# Patient Record
Sex: FEMALE | Race: WHITE | NOT HISPANIC OR LATINO | Employment: FULL TIME | URBAN - METROPOLITAN AREA
[De-identification: names, ages, dates, MRNs, and addresses within clinical notes are randomized per-mention and may not be internally consistent; named-entity substitution may affect disease eponyms.]

---

## 2021-09-28 ENCOUNTER — TRANSCRIBE ORDERS (OUTPATIENT)
Dept: PHYSICAL THERAPY | Facility: CLINIC | Age: 36
End: 2021-09-28

## 2021-09-28 DIAGNOSIS — M54.2 PAIN, NECK: Primary | ICD-10-CM

## 2021-09-28 DIAGNOSIS — M54.9 UPPER BACK PAIN: ICD-10-CM

## 2021-09-30 ENCOUNTER — TREATMENT (OUTPATIENT)
Dept: PHYSICAL THERAPY | Facility: CLINIC | Age: 36
End: 2021-09-30

## 2021-09-30 DIAGNOSIS — M54.2 PAIN, NECK: Primary | ICD-10-CM

## 2021-09-30 PROCEDURE — 97161 PT EVAL LOW COMPLEX 20 MIN: CPT | Performed by: PHYSICAL THERAPIST

## 2021-09-30 PROCEDURE — 97530 THERAPEUTIC ACTIVITIES: CPT | Performed by: PHYSICAL THERAPIST
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
## 2021-10-04 ENCOUNTER — TELEPHONE (OUTPATIENT)
Dept: PHYSICAL THERAPY | Facility: CLINIC | Age: 36
End: 2021-10-04

## 2021-10-06 ENCOUNTER — TREATMENT (OUTPATIENT)
Dept: PHYSICAL THERAPY | Facility: CLINIC | Age: 36
End: 2021-10-06

## 2021-10-06 DIAGNOSIS — M54.2 PAIN, NECK: Primary | ICD-10-CM

## 2021-10-06 PROCEDURE — 97110 THERAPEUTIC EXERCISES: CPT | Performed by: PHYSICAL THERAPIST

## 2021-10-06 PROCEDURE — 97530 THERAPEUTIC ACTIVITIES: CPT | Performed by: PHYSICAL THERAPIST

## 2021-10-06 PROCEDURE — 97140 MANUAL THERAPY 1/> REGIONS: CPT | Performed by: PHYSICAL THERAPIST

## 2021-10-06 NOTE — PROGRESS NOTES
Physical Therapy Daily Progress Note        Patient: Desi Jean   : 1985  Diagnosis/ICD-10 Code:  Pain, neck [M54.2]  Referring practitioner: Wilver Palomino MD  Date of Initial Visit: Type: THERAPY  Noted: 2021  Today's Date: 10/6/2021  Patient seen for 2 sessions           Patient reports: Patient reports less frequent muscle pain in her neck but her headache frequency has increased (every couple hours, lasting 45 min to 1 hour).       Objective          Active Range of Motion   Cervical/Thoracic Spine   Cervical    Left rotation: 65 degrees   Right rotation: 60 degrees       See Exercise, Manual, and Modality Logs for complete treatment.       Assessment/Plan    Improved cervical AROM noted compared to eval. Cues for form decreased neck pain during supine DNF, as patient was lifting her head at home. Added UBE and band shoulder ext for tissue perfusion and scapular retraining. Overall patient's irritability is improving.          Timed:  Manual Therapy:    10     mins  91251;  Therapeutic Exercise:    15     mins  78369;     Neuromuscular Jessica:   20    mins  06874;    Therapeutic Activity:     0     mins  21999;     Gait Trainin     mins  92948;     Ultrasound:     0     mins  88326;    Electrical Stimulation:    0     mins  83472 ( );    Untimed:  Electrical Stimulation:    0     mins  94190 ( );  Mechanical Traction:    0     mins  30349;     Timed Treatment:   0   mins   Total Treatment:     0   mins    Randy Mckinney PT  Physical Therapist

## 2021-10-11 ENCOUNTER — TREATMENT (OUTPATIENT)
Dept: PHYSICAL THERAPY | Facility: CLINIC | Age: 36
End: 2021-10-11

## 2021-10-11 DIAGNOSIS — M54.2 PAIN, NECK: Primary | ICD-10-CM

## 2021-10-11 PROCEDURE — 97112 NEUROMUSCULAR REEDUCATION: CPT | Performed by: PHYSICAL THERAPIST

## 2021-10-11 PROCEDURE — 97140 MANUAL THERAPY 1/> REGIONS: CPT | Performed by: PHYSICAL THERAPIST

## 2021-10-11 NOTE — PROGRESS NOTES
Physical Therapy Daily Progress Note        Patient: Desi Jean   : 1985  Diagnosis/ICD-10 Code:  Pain, neck [M54.2]  Referring practitioner: Wilver Palomino MD  Date of Initial Visit: Type: THERAPY  Noted: 2021  Today's Date: 10/11/2021  Patient seen for 3 sessions           Patient reports: increased headache from long walk with her baby to PT. Tried to ice her neck, but started feeling pain after a few minutes and stopped.       Objective   See Exercise, Manual, and Modality Logs for complete treatment.       Assessment/Plan    With treatment shortened due to patient tardiness, today's focus was on reinforcing HEP, educating about cryotherapy at home, and attempting manual interventions. Held band exercises due to poor stability, likely due to patient still recovering from .          Timed:  Manual Therapy:    15     mins  64364;  Therapeutic Exercise:    0     mins  22852;     Neuromuscular Jessica:   15    mins  13340;    Therapeutic Activity:     0     mins  96927;     Gait Trainin     mins  68061;     Ultrasound:     0     mins  28379;    Electrical Stimulation:    0     mins  87674 ( );    Untimed:  Electrical Stimulation:    0     mins  30466 ( );  Mechanical Traction:    0     mins  51419;     Timed Treatment:   30   mins   Total Treatment:     30   mins    Randy Mckinney PT  Physical Therapist

## 2021-10-12 ENCOUNTER — TELEPHONE (OUTPATIENT)
Dept: PHYSICAL THERAPY | Facility: CLINIC | Age: 36
End: 2021-10-12

## 2021-10-21 ENCOUNTER — TREATMENT (OUTPATIENT)
Dept: PHYSICAL THERAPY | Facility: CLINIC | Age: 36
End: 2021-10-21

## 2021-10-21 DIAGNOSIS — M54.2 PAIN, NECK: Primary | ICD-10-CM

## 2021-10-21 PROCEDURE — 97110 THERAPEUTIC EXERCISES: CPT | Performed by: PHYSICAL THERAPIST

## 2021-10-21 PROCEDURE — 97530 THERAPEUTIC ACTIVITIES: CPT | Performed by: PHYSICAL THERAPIST

## 2021-10-21 PROCEDURE — 97140 MANUAL THERAPY 1/> REGIONS: CPT | Performed by: PHYSICAL THERAPIST

## 2021-10-21 NOTE — PROGRESS NOTES
Physical Therapy Daily Progress Note        Patient: Desi Jean   : 1985  Diagnosis/ICD-10 Code:  Pain, neck [M54.2]  Referring practitioner: Wilver Palomino MD  Date of Initial Visit: Type: THERAPY  Noted: 2021  Today's Date: 10/21/2021  Patient seen for 4 sessions           Patient reports: her headaches and neck have improved since last visit, but her L neck and low back have remained painful. Slouching relieves low back pain.      Objective          Palpation     Additional Palpation Details  Joint mobility: hypomobile and painful bilateral OA and AA (all referred into headache), normal mid-cervical mobility, and hypomobile lower cervical and upper thoracic segments      See Exercise, Manual, and Modality Logs for complete treatment.       Assessment/Plan  Able to assess patient in prone for first time. Hypomobility noted in thoracic, lower and upper cervical spine, with upper cervical spine reproducing headache. Added HEP for AA mobility. Progressed DNF to include lift with fair tolerance. C/O low back pain reduced with posterior pelvic tilt, but no rectus exercises performed due to <6 weeks s/p .            Timed:  Manual Therapy:    15     mins  28489;  Therapeutic Exercise:    23     mins  10148;     Neuromuscular Jessica:   0    mins  49365;    Therapeutic Activity:     15     mins  96161;     Gait Trainin     mins  12184;     Ultrasound:     0     mins  95326;    Electrical Stimulation:    0     mins  38367 ( );    Untimed:  Electrical Stimulation:    0     mins  62927 ( );  Mechanical Traction:    0     mins  82365;     Timed Treatment:   53   mins   Total Treatment:     53   mins    Randy Mckinney PT  Physical Therapist

## 2021-10-26 ENCOUNTER — TREATMENT (OUTPATIENT)
Dept: PHYSICAL THERAPY | Facility: CLINIC | Age: 36
End: 2021-10-26

## 2021-10-26 DIAGNOSIS — M54.2 PAIN, NECK: Primary | ICD-10-CM

## 2021-10-26 PROCEDURE — 97140 MANUAL THERAPY 1/> REGIONS: CPT | Performed by: PHYSICAL THERAPIST

## 2021-10-26 PROCEDURE — 97110 THERAPEUTIC EXERCISES: CPT | Performed by: PHYSICAL THERAPIST

## 2021-10-26 PROCEDURE — 97530 THERAPEUTIC ACTIVITIES: CPT | Performed by: PHYSICAL THERAPIST

## 2021-10-26 NOTE — PROGRESS NOTES
Physical Therapy Daily Progress Note        Patient: Desi Jean   : 1985  Diagnosis/ICD-10 Code:  Pain, neck [M54.2]  Referring practitioner: Wilver Palomino MD  Date of Initial Visit: Type: THERAPY  Noted: 2021  Today's Date: 10/26/2021  Patient seen for 5 sessions           Patient reports: same R neck pain, but less intense headaches.       Objective          Strength/Myotome Testing     Left Shoulder     Isolated Muscles   Lower trapezius: 3+     Right Shoulder     Isolated Muscles   Lower trapezius: 3+       See Exercise, Manual, and Modality Logs for complete treatment.       Assessment/Plan    Pre treatment, AROM cervical rotation L 75 deg and R 60 deg. R AA remains hypomobile and painful, with R rotation increase to  After. No change in symptoms after mobilization of costotransverse joints, which did replicate mid thoracic pain. Attempted multifidus retraining, but unable to engage lower abdominals for stability so she experienced lumbar pain. Significant B lower trap weakness noted, which could be aggravating upper trap.          Timed:  Manual Therapy:    25     mins  26276;  Therapeutic Exercise:    15     mins  91604;     Neuromuscular Jessica:   0    mins  46832;    Therapeutic Activity:     10     mins  13653;     Gait Trainin     mins  50910;     Ultrasound:     0     mins  80996;    Electrical Stimulation:    0     mins  34714 ( );    Untimed:  Electrical Stimulation:    0     mins  66659 ( );  Mechanical Traction:    0     mins  57907;     Timed Treatment:   50   mins   Total Treatment:     50   mins    Randy Mckinney PT  Physical Therapist

## 2021-10-28 ENCOUNTER — TREATMENT (OUTPATIENT)
Dept: PHYSICAL THERAPY | Facility: CLINIC | Age: 36
End: 2021-10-28

## 2021-10-28 DIAGNOSIS — M54.2 PAIN, NECK: Primary | ICD-10-CM

## 2021-10-28 PROCEDURE — 97110 THERAPEUTIC EXERCISES: CPT | Performed by: PHYSICAL THERAPIST

## 2021-10-28 PROCEDURE — 97140 MANUAL THERAPY 1/> REGIONS: CPT | Performed by: PHYSICAL THERAPIST

## 2021-11-09 ENCOUNTER — TREATMENT (OUTPATIENT)
Dept: PHYSICAL THERAPY | Facility: CLINIC | Age: 36
End: 2021-11-09

## 2021-11-09 DIAGNOSIS — M54.2 PAIN, NECK: Primary | ICD-10-CM

## 2021-11-09 PROCEDURE — 97140 MANUAL THERAPY 1/> REGIONS: CPT | Performed by: PHYSICAL THERAPIST

## 2021-11-09 PROCEDURE — 97530 THERAPEUTIC ACTIVITIES: CPT | Performed by: PHYSICAL THERAPIST

## 2021-11-09 PROCEDURE — 97110 THERAPEUTIC EXERCISES: CPT | Performed by: PHYSICAL THERAPIST

## 2021-11-09 NOTE — PROGRESS NOTES
Physical Therapy Daily Progress Note        Patient: Desi Jean   : 1985  Diagnosis/ICD-10 Code:  Pain, neck [M54.2]  Referring practitioner: Wilver Palomino MD  Date of Initial Visit: Type: THERAPY  Noted: 2021  Today's Date: 2021  Patient seen for 7 sessions           Patient reports: no headaches in the past week and her upper back is improving. Also reporting improved sleep in recent days. Low back is now more painful than upper back. She sees her OB/GYN next week for pre-op.      Objective   See Exercise, Manual, and Modality Logs for complete treatment.       Assessment/Plan  Pre treatment, AROM cervical rotation 75 deg L and 60 deg R w/ pain. Thought R upper trap and C1/2 were TTP with familiar symptom reproduction, no change in ROM or pain after mobilization of these tissues. Added lat pull down to HEP for lower trap strengthening and upper trap inhibition.            Timed:  Manual Therapy:    20     mins  66519;  Therapeutic Exercise:    12     mins  67936;     Neuromuscular Jessica:   0    mins  02257;    Therapeutic Activity:     12     mins  22408;     Gait Trainin     mins  21685;     Ultrasound:     0     mins  03090;    Electrical Stimulation:    0     mins  44023 ( );    Untimed:  Electrical Stimulation:    0     mins  09478 ( );  Mechanical Traction:    0     mins  28776;     Timed Treatment:   44   mins   Total Treatment:     44   mins    Randy Mckinney PT  Physical Therapist

## 2021-11-16 ENCOUNTER — TRANSCRIBE ORDERS (OUTPATIENT)
Dept: ADMINISTRATIVE | Facility: HOSPITAL | Age: 36
End: 2021-11-16

## 2021-11-16 DIAGNOSIS — Z11.59 ENCOUNTER FOR SCREENING FOR VIRAL DISEASE: Primary | ICD-10-CM

## 2021-12-01 ENCOUNTER — LAB (OUTPATIENT)
Dept: PREADMISSION TESTING | Facility: HOSPITAL | Age: 36
End: 2021-12-01

## 2021-12-01 DIAGNOSIS — Z11.59 ENCOUNTER FOR SCREENING FOR VIRAL DISEASE: ICD-10-CM

## 2021-12-01 PROCEDURE — C9803 HOPD COVID-19 SPEC COLLECT: HCPCS

## 2021-12-01 PROCEDURE — U0004 COV-19 TEST NON-CDC HGH THRU: HCPCS

## 2021-12-02 LAB — SARS-COV-2 RNA PNL SPEC NAA+PROBE: NOT DETECTED

## 2022-01-07 ENCOUNTER — DOCUMENTATION (OUTPATIENT)
Dept: PHYSICAL THERAPY | Facility: CLINIC | Age: 37
End: 2022-01-07

## 2022-01-07 NOTE — PROGRESS NOTES
Discharge Summary  Discharge Summary from Physical Therapy Report      Dates PT visit(s): 10/6-11/9   Number of Visits: 6     Discharge Status of Patient: unknown    Goals: Partially Met    Discharge Plan: Continue with current home exercise program as instructed    Comments Patient was seen for neck pain and headaches 3 weeks post partum. Her condition gradually improved but a consistent barrier was her role as sole caregiver for her child. She no showed and cancelled her last 3 weeks of visits, which was likely due to patient starting 2 jobs. Current status is unknown.    Date of Discharge 1/7/22      Randy Mckinney, PT  Physical Therapist

## 2022-08-02 ENCOUNTER — TRANSCRIBE ORDERS (OUTPATIENT)
Dept: ADMINISTRATIVE | Facility: HOSPITAL | Age: 37
End: 2022-08-02

## 2022-08-02 DIAGNOSIS — M54.16 LEFT LUMBAR RADICULOPATHY: ICD-10-CM

## 2022-08-02 DIAGNOSIS — S39.012A LUMBAR STRAIN, INITIAL ENCOUNTER: Primary | ICD-10-CM

## 2022-09-13 ENCOUNTER — HOSPITAL ENCOUNTER (OUTPATIENT)
Dept: MRI IMAGING | Facility: HOSPITAL | Age: 37
Discharge: HOME | End: 2022-09-13
Admitting: PHYSICAL MEDICINE & REHABILITATION

## 2022-09-13 DIAGNOSIS — S39.012A LUMBAR STRAIN, INITIAL ENCOUNTER: ICD-10-CM

## 2022-09-13 DIAGNOSIS — M54.16 LEFT LUMBAR RADICULOPATHY: ICD-10-CM

## 2022-09-13 PROCEDURE — 72148 MRI LUMBAR SPINE W/O DYE: CPT

## 2023-02-07 ENCOUNTER — OFFICE VISIT (OUTPATIENT)
Dept: NEUROSURGERY | Facility: CLINIC | Age: 38
End: 2023-02-07
Payer: COMMERCIAL

## 2023-02-07 VITALS
HEIGHT: 61 IN | WEIGHT: 147.4 LBS | BODY MASS INDEX: 27.83 KG/M2 | DIASTOLIC BLOOD PRESSURE: 82 MMHG | SYSTOLIC BLOOD PRESSURE: 122 MMHG | TEMPERATURE: 97.3 F

## 2023-02-07 DIAGNOSIS — M47.819 FACET ARTHROPATHY: ICD-10-CM

## 2023-02-07 DIAGNOSIS — M54.9 MECHANICAL BACK PAIN: ICD-10-CM

## 2023-02-07 DIAGNOSIS — M51.36 DISC DEGENERATION, LUMBAR: Primary | ICD-10-CM

## 2023-02-07 PROCEDURE — 99204 OFFICE O/P NEW MOD 45 MIN: CPT | Performed by: PHYSICIAN ASSISTANT

## 2023-02-07 RX ORDER — MULTIPLE VITAMINS W/ MINERALS TAB 9MG-400MCG
1 TAB ORAL DAILY
COMMUNITY

## 2023-02-07 RX ORDER — MELOXICAM 7.5 MG/1
7.5 TABLET ORAL DAILY
Qty: 60 TABLET | Refills: 1 | Status: SHIPPED | OUTPATIENT
Start: 2023-02-07

## 2023-02-07 RX ORDER — MAGNESIUM GLUCONATE 27 MG(500)
27 TABLET ORAL DAILY
COMMUNITY

## 2023-02-07 RX ORDER — MULTIVIT WITH MINERALS/LUTEIN
1000 TABLET ORAL DAILY
COMMUNITY

## 2023-02-07 RX ORDER — B-COMPLEX WITH VITAMIN C
100 TABLET ORAL DAILY
COMMUNITY

## 2023-02-07 RX ORDER — CHLORAL HYDRATE 500 MG
2000 CAPSULE ORAL
COMMUNITY

## 2023-02-07 RX ORDER — DANDELION ROOT 525 MG
500 CAPSULE ORAL DAILY
COMMUNITY

## 2023-02-07 RX ORDER — 5-HYDROXYTRYPTOPHAN (5-HTP) 100 MG
100 CAPSULE ORAL DAILY
COMMUNITY
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
## 2023-02-12 ENCOUNTER — PATIENT ROUNDING (BHMG ONLY) (OUTPATIENT)
Dept: NEUROSURGERY | Facility: CLINIC | Age: 38
End: 2023-02-12
Payer: COMMERCIAL

## 2023-02-12 NOTE — PROGRESS NOTES
A MY-CHART MESSAGE HAS BEEN SENT TO THE PATIENT FOR PATIENT ROUNDING WITH [facility].

## 2024-10-09 ENCOUNTER — HOSPITAL ENCOUNTER (EMERGENCY)
Facility: HOSPITAL | Age: 39
Discharge: HOME | End: 2024-10-09
Attending: EMERGENCY MEDICINE
Payer: MEDICAID

## 2024-10-09 ENCOUNTER — APPOINTMENT (OUTPATIENT)
Dept: GENERAL RADIOLOGY | Facility: HOSPITAL | Age: 39
End: 2024-10-09
Attending: EMERGENCY MEDICINE
Payer: MEDICAID

## 2024-10-09 VITALS
WEIGHT: 144 LBS | HEART RATE: 84 BPM | TEMPERATURE: 98.2 F | HEIGHT: 62 IN | OXYGEN SATURATION: 100 % | SYSTOLIC BLOOD PRESSURE: 119 MMHG | BODY MASS INDEX: 26.5 KG/M2 | DIASTOLIC BLOOD PRESSURE: 104 MMHG

## 2024-10-09 DIAGNOSIS — R05.2 SUBACUTE COUGH: Primary | ICD-10-CM

## 2024-10-09 LAB
ANION GAP SERPL CALCULATED.3IONS-SCNC: 11 MMOL/L (ref 3–16)
BASOPHILS # BLD: 0 K/UL (ref 0–0.1)
BASOPHILS NFR BLD: 0.2 %
BUN SERPL-MCNC: 10 MG/DL (ref 6–20)
CALCIUM SERPL-MCNC: 9.1 MG/DL (ref 8.5–10.5)
CHLORIDE SERPL-SCNC: 106 MMOL/L (ref 98–107)
CO2 SERPL-SCNC: 25 MMOL/L (ref 20–30)
CREAT SERPL-MCNC: 0.8 MG/DL (ref 0.4–1.2)
EOSINOPHIL # BLD: 0.1 K/UL (ref 0–0.4)
EOSINOPHIL NFR BLD: 2 %
ERYTHROCYTE [DISTWIDTH] IN BLOOD BY AUTOMATED COUNT: 12.1 % (ref 11–16)
FLUAV AG NPH QL: NEGATIVE
FLUBV AG NPH QL: NEGATIVE
GFR SERPLBLD CREATININE-BSD FMLA CKD-EPI: >90 ML/MIN/{1.73_M2}
GLUCOSE SERPL-MCNC: 97 MG/DL (ref 74–106)
HCT VFR BLD AUTO: 41.6 % (ref 37–47)
HGB BLD-MCNC: 14.2 G/DL (ref 11.5–16.5)
IMM GRANULOCYTES # BLD: 0 K/UL
IMM GRANULOCYTES NFR BLD: 0.4 % (ref 0–5)
LACTATE BLDV-SCNC: 1.8 MMOL/L (ref 0.4–2)
LYMPHOCYTES # BLD: 1.8 K/UL (ref 1.5–4)
LYMPHOCYTES NFR BLD: 40.3 %
MCH RBC QN AUTO: 30 PG (ref 27–32)
MCHC RBC AUTO-ENTMCNC: 34.1 G/DL (ref 31–35)
MCV RBC AUTO: 87.9 FL (ref 80–100)
MONOCYTES # BLD: 0.4 K/UL (ref 0.2–0.8)
MONOCYTES NFR BLD: 8.8 %
NEUTROPHILS # BLD: 2.2 K/UL (ref 2–7.5)
NEUTS SEG NFR BLD: 48.3 %
PLATELET # BLD AUTO: 190 K/UL (ref 150–400)
PMV BLD AUTO: 10.5 FL (ref 6–10)
POTASSIUM SERPL-SCNC: 3.8 MMOL/L (ref 3.4–5.1)
RBC # BLD AUTO: 4.73 M/UL (ref 3.8–5.8)
RSV AG NOSE QL: NEGATIVE
S PYO AG THROAT QL: NEGATIVE
SARS-COV-2 RDRP RESP QL NAA+PROBE: NOT DETECTED
SODIUM SERPL-SCNC: 142 MMOL/L (ref 136–145)
WBC # BLD AUTO: 4.5 K/UL (ref 4–11)

## 2024-10-09 PROCEDURE — 83605 ASSAY OF LACTIC ACID: CPT

## 2024-10-09 PROCEDURE — 87040 BLOOD CULTURE FOR BACTERIA: CPT

## 2024-10-09 PROCEDURE — 99284 EMERGENCY DEPT VISIT MOD MDM: CPT

## 2024-10-09 PROCEDURE — 87798 DETECT AGENT NOS DNA AMP: CPT

## 2024-10-09 PROCEDURE — 87880 STREP A ASSAY W/OPTIC: CPT

## 2024-10-09 PROCEDURE — 94640 AIRWAY INHALATION TREATMENT: CPT

## 2024-10-09 PROCEDURE — 71046 X-RAY EXAM CHEST 2 VIEWS: CPT

## 2024-10-09 PROCEDURE — 87635 SARS-COV-2 COVID-19 AMP PRB: CPT

## 2024-10-09 PROCEDURE — 85025 COMPLETE CBC W/AUTO DIFF WBC: CPT

## 2024-10-09 PROCEDURE — 87804 INFLUENZA ASSAY W/OPTIC: CPT

## 2024-10-09 PROCEDURE — 6370000000 HC RX 637 (ALT 250 FOR IP): Performed by: EMERGENCY MEDICINE

## 2024-10-09 PROCEDURE — 80048 BASIC METABOLIC PNL TOTAL CA: CPT

## 2024-10-09 PROCEDURE — 87807 RSV ASSAY W/OPTIC: CPT

## 2024-10-09 PROCEDURE — 36415 COLL VENOUS BLD VENIPUNCTURE: CPT

## 2024-10-09 RX ORDER — IPRATROPIUM BROMIDE AND ALBUTEROL SULFATE 2.5; .5 MG/3ML; MG/3ML
1 SOLUTION RESPIRATORY (INHALATION) ONCE
Status: COMPLETED | OUTPATIENT
Start: 2024-10-09 | End: 2024-10-09

## 2024-10-09 RX ADMIN — IPRATROPIUM BROMIDE AND ALBUTEROL SULFATE 1 DOSE: .5; 3 SOLUTION RESPIRATORY (INHALATION) at 14:25

## 2024-10-09 ASSESSMENT — LIFESTYLE VARIABLES
HOW MANY STANDARD DRINKS CONTAINING ALCOHOL DO YOU HAVE ON A TYPICAL DAY: PATIENT DOES NOT DRINK
HOW OFTEN DO YOU HAVE A DRINK CONTAINING ALCOHOL: NEVER

## 2024-10-09 ASSESSMENT — PAIN - FUNCTIONAL ASSESSMENT: PAIN_FUNCTIONAL_ASSESSMENT: 0-10

## 2024-10-09 ASSESSMENT — PAIN DESCRIPTION - LOCATION: LOCATION: RIB CAGE

## 2024-10-09 NOTE — ED TRIAGE NOTES
Patient came to ED today with complaints of being sick x 2 weeks. Patient reports that she began having a cough and fever of 102-103. \"About a week ago\" she began having nausea and vomiting so she followed up at urgent care and was negative for COVID and FLU. Patient reports at urgent care, her heart rate was also elevated. Patient states now she has bilateral rib pain that is worse with inhaling and cannot exhale completely without coughing. Patient reports productive cough \"thick brown/green sputum\". Patient reports no appetite and unable to keep anything down. Patient states she has went from 160 lb to 144 lb. Actual weight is 144 lb today.

## 2024-10-09 NOTE — ED PROVIDER NOTES
.        [facility]  EMERGENCY DEPARTMENT ENCOUNTER        Pt Name: Erma Dias  MRN: [id number]  Birthdate 1985  Date of evaluation: 10/9/2024  Provider: Kasia Turcios MD  PCP: No primary care provider on file.  Note Started: 1:30 PM EDT 10/9/24    CHIEF COMPLAINT       Chief Complaint   Patient presents with    Cough    Fever    Rib Pain (injury)       HISTORY OF PRESENT ILLNESS: 1 or more Elements     History from : Patient    Limitations to history : None    Erma Dias is a 39 y.o. female who presents complaining of 3 weeks of cough decreased hearing in her right ear she did see urgent care a week ago was tested negative for COVID and flu since that time she developed fever which has again resolved over the last couple days she has had vomiting and diarrhea.  She was tested for COVID and flu a week ago which was negative.  She now has a severe cough productive of brown sputum continues to have head congestion and sore throat.    Nursing Notes were all reviewed and agreed with or any disagreements were addressed in the HPI.    REVIEW OF SYSTEMS :      Review of Systems     systems reviewed and negative except as in HPI/MDM    SURGICAL HISTORY     Past Surgical History:   Procedure Laterality Date    CHOLECYSTECTOMY      TUBAL LIGATION         CURRENTMEDICATIONS       Previous Medications    No medications on file       ALLERGIES     Patient has no known allergies.    FAMILYHISTORY     History reviewed. No pertinent family history.     SOCIAL HISTORY       Social History     Tobacco Use    Smoking status: Never    Smokeless tobacco: Never   Vaping Use    Vaping status: Some Days   Substance Use Topics    Alcohol use: Never    Drug use: Never       SCREENINGS        Julio C Coma Scale  Eye Opening: Spontaneous  Best Verbal Response: Oriented  Best Motor Response: Obeys commands  [place] Coma Scale Score: 15                CIWA Assessment  BP: (!) 119/104  Pulse: 84

## 2024-10-11 LAB — BACTERIA BLD CULT ORG #2: NORMAL

## 2024-10-12 LAB
B PARAPERT DNA SPEC QL NAA+PROBE: NOT DETECTED
B PERT DNA SPEC QL NAA+PROBE: NOT DETECTED
SPECIMEN SOURCE: NORMAL

## 2024-10-13 LAB — BACTERIA BLD CULT: NORMAL

## 2024-10-14 LAB — BACTERIA BLD CULT ORG #2: NORMAL

## 2025-03-20 ENCOUNTER — OFFICE VISIT (OUTPATIENT)
Age: 40
End: 2025-03-20
Payer: MEDICAID

## 2025-03-20 VITALS
DIASTOLIC BLOOD PRESSURE: 61 MMHG | HEART RATE: 55 BPM | SYSTOLIC BLOOD PRESSURE: 97 MMHG | WEIGHT: 151 LBS | TEMPERATURE: 98.6 F | HEIGHT: 61 IN | OXYGEN SATURATION: 97 % | BODY MASS INDEX: 28.51 KG/M2

## 2025-03-20 DIAGNOSIS — R11.2 NAUSEA AND VOMITING, UNSPECIFIED VOMITING TYPE: Primary | ICD-10-CM

## 2025-03-20 LAB
INFLUENZA A ANTIBODY: NORMAL
INFLUENZA B ANTIBODY: NORMAL
Lab: NORMAL
QC PASS/FAIL: NORMAL
SARS-COV-2 RDRP RESP QL NAA+PROBE: NEGATIVE

## 2025-03-20 PROCEDURE — 87635 SARS-COV-2 COVID-19 AMP PRB: CPT | Performed by: PHYSICIAN ASSISTANT

## 2025-03-20 PROCEDURE — 87804 INFLUENZA ASSAY W/OPTIC: CPT | Performed by: PHYSICIAN ASSISTANT

## 2025-03-20 PROCEDURE — G8427 DOCREV CUR MEDS BY ELIG CLIN: HCPCS | Performed by: PHYSICIAN ASSISTANT

## 2025-03-20 PROCEDURE — 99213 OFFICE O/P EST LOW 20 MIN: CPT | Performed by: PHYSICIAN ASSISTANT

## 2025-03-20 PROCEDURE — G8419 CALC BMI OUT NRM PARAM NOF/U: HCPCS | Performed by: PHYSICIAN ASSISTANT

## 2025-03-20 PROCEDURE — 1036F TOBACCO NON-USER: CPT | Performed by: PHYSICIAN ASSISTANT

## 2025-03-20 RX ORDER — ONDANSETRON 4 MG/1
4 TABLET, ORALLY DISINTEGRATING ORAL 3 TIMES DAILY PRN
Qty: 21 TABLET | Refills: 0 | Status: SHIPPED | OUTPATIENT
Start: 2025-03-20

## 2025-03-20 ASSESSMENT — PATIENT HEALTH QUESTIONNAIRE - PHQ9: DEPRESSION UNABLE TO ASSESS: URGENT/EMERGENT SITUATION

## 2025-03-20 ASSESSMENT — ENCOUNTER SYMPTOMS
NAUSEA: 1
RESPIRATORY NEGATIVE: 1
DIARRHEA: 1
VOMITING: 1

## 2025-03-20 NOTE — PROGRESS NOTES
she is pregnant.  Likely has gastroenteritis.  Will treat with Zofran, increase fluids brat diet.  Any worsening go to ER.  Return to clinic as needed.    Orders Placed This Encounter   Procedures    POCT COVID-19 Rapid, NAAT     Date of Symptom Onset:   3/18/2025     Reason for Test:   Symptomatic     Pregnant?:   Unknown    POCT Influenza A/B      Orders Placed This Encounter   Medications    ondansetron (ZOFRAN-ODT) 4 MG disintegrating tablet     Sig: Take 1 tablet by mouth 3 times daily as needed for Nausea or Vomiting     Dispense:  21 tablet     Refill:  0       DDx includes but is not limited to -COVID, flu, gastroenteritis, food poisoning    Discussed ddx/dx and tx plan with pt. Discussed risks vs benefits of tx. Pt and/or Guardian is/are A&Ox3 and verbally acknowledges understanding. Pt agrees with tx plan. Pt agrees if acute worsening to go to the ER for evaluation and tx, or return to clinic for re-evaluation. Otherwise, pt should f/u with PCP. Pt verbally acknowledges understanding of results of any tests, procedures and/or imaging done at this visit.    Parts of this document/medical record have been dictated via [employer] speech recognition software, which may cause errors in spelling or accurate dictation. Attempts have been made to proofread and correct all errors. Please contact me with any questions.     No follow-ups on file.  Orders Placed This Encounter   Procedures    POCT COVID-19 Rapid, NAAT     Date of Symptom Onset:   3/18/2025     Reason for Test:   Symptomatic     Pregnant?:   Unknown    POCT Influenza A/B           Electronically signed by FREDI Dickson on 3/20/2025